# Patient Record
Sex: MALE | Race: WHITE | NOT HISPANIC OR LATINO | Employment: UNEMPLOYED | URBAN - METROPOLITAN AREA
[De-identification: names, ages, dates, MRNs, and addresses within clinical notes are randomized per-mention and may not be internally consistent; named-entity substitution may affect disease eponyms.]

---
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
## 2024-01-25 DIAGNOSIS — F90.1 ATTENTION DEFICIT HYPERACTIVITY DISORDER (ADHD), PREDOMINANTLY HYPERACTIVE TYPE: ICD-10-CM

## 2024-01-25 RX ORDER — LISDEXAMFETAMINE DIMESYLATE 40 MG/1
40 CAPSULE ORAL EVERY MORNING
Qty: 30 CAPSULE | Refills: 0 | Status: SHIPPED | OUTPATIENT
Start: 2024-01-25 | End: 2024-02-27 | Stop reason: SDUPTHER

## 2024-02-22 ENCOUNTER — PATIENT MESSAGE (OUTPATIENT)
Dept: FAMILY MEDICINE | Facility: CLINIC | Age: 19
End: 2024-02-22
Payer: COMMERCIAL

## 2024-02-22 DIAGNOSIS — F90.1 ATTENTION DEFICIT HYPERACTIVITY DISORDER (ADHD), PREDOMINANTLY HYPERACTIVE TYPE: ICD-10-CM

## 2024-02-22 RX ORDER — LISDEXAMFETAMINE DIMESYLATE 40 MG/1
40 CAPSULE ORAL EVERY MORNING
Qty: 30 CAPSULE | Refills: 0 | Status: CANCELLED | OUTPATIENT
Start: 2024-02-22

## 2024-02-23 ENCOUNTER — LAB VISIT (OUTPATIENT)
Dept: LAB | Facility: HOSPITAL | Age: 19
End: 2024-02-23
Attending: STUDENT IN AN ORGANIZED HEALTH CARE EDUCATION/TRAINING PROGRAM
Payer: COMMERCIAL

## 2024-02-23 DIAGNOSIS — F90.1 ATTENTION DEFICIT HYPERACTIVITY DISORDER (ADHD), PREDOMINANTLY HYPERACTIVE TYPE: ICD-10-CM

## 2024-02-23 PROCEDURE — 80355 GABAPENTIN NON-BLOOD: CPT | Performed by: STUDENT IN AN ORGANIZED HEALTH CARE EDUCATION/TRAINING PROGRAM

## 2024-02-23 PROCEDURE — 80326 AMPHETAMINES 5 OR MORE: CPT | Performed by: STUDENT IN AN ORGANIZED HEALTH CARE EDUCATION/TRAINING PROGRAM

## 2024-02-27 DIAGNOSIS — F90.1 ATTENTION DEFICIT HYPERACTIVITY DISORDER (ADHD), PREDOMINANTLY HYPERACTIVE TYPE: ICD-10-CM

## 2024-02-27 LAB
6MAM UR QL: NOT DETECTED
7AMINOCLONAZEPAM UR QL: NOT DETECTED
A-OH ALPRAZ UR QL: NOT DETECTED
ALPHA-OH-MIDAZOLAM: NOT DETECTED
ALPRAZ UR QL: NOT DETECTED
AMPHET UR QL SCN: PRESENT
ANNOTATION COMMENT IMP: NORMAL
BARBITURATES UR QL: NEGATIVE
BUPRENORPHINE UR QL: NOT DETECTED
BZE UR QL: NEGATIVE
CARBOXYTHC UR QL: NEGATIVE
CARISOPRODOL UR QL: NEGATIVE
CLONAZEPAM UR QL: NOT DETECTED
CODEINE UR QL: NOT DETECTED
CREAT UR-MCNC: 105.1 MG/DL (ref 20–400)
DIAZEPAM UR QL: NOT DETECTED
ETHYL GLUCURONIDE UR QL: NEGATIVE
FENTANYL UR QL: NOT DETECTED
GABAPENTIN: NOT DETECTED
HYDROCODONE UR QL: NOT DETECTED
HYDROMORPHONE UR QL: NOT DETECTED
LORAZEPAM UR QL: NOT DETECTED
MDA UR QL: NOT DETECTED
MDEA UR QL: NOT DETECTED
MDMA UR QL: NOT DETECTED
ME-PHENIDATE UR QL: NOT DETECTED
METHADONE UR QL: NEGATIVE
METHAMPHET UR QL: NOT DETECTED
MIDAZOLAM UR QL SCN: NOT DETECTED
MORPHINE UR QL: NOT DETECTED
NALOXONE: NOT DETECTED
NORBUPRENORPHINE UR QL CFM: NOT DETECTED
NORDIAZEPAM UR QL: NOT DETECTED
NORFENTANYL UR QL: NOT DETECTED
NORHYDROCODONE UR QL CFM: NOT DETECTED
NORMEPERIDINE UR QL CFM: NOT DETECTED
NOROXYCODONE UR QL CFM: NOT DETECTED
NOROXYMORPHONE UR QL SCN: NOT DETECTED
OXAZEPAM UR QL: NOT DETECTED
OXYCODONE UR QL: NOT DETECTED
OXYMORPHONE UR QL: NOT DETECTED
PATHOLOGY STUDY: NORMAL
PCP UR QL: NEGATIVE
PHENTERMINE UR QL: NOT DETECTED
PREGABALIN: NOT DETECTED
SERVICE CMNT-IMP: NORMAL
TAPENTADOL UR QL SCN: NOT DETECTED
TAPENTADOL UR QL SCN: NOT DETECTED
TEMAZEPAM UR QL: NOT DETECTED
TRAMADOL UR QL: NEGATIVE
ZOLPIDEM METABOLITE: NOT DETECTED
ZOLPIDEM UR QL: NOT DETECTED

## 2024-02-27 RX ORDER — LISDEXAMFETAMINE DIMESYLATE 40 MG/1
40 CAPSULE ORAL EVERY MORNING
Qty: 30 CAPSULE | Refills: 0 | Status: SHIPPED | OUTPATIENT
Start: 2024-02-27 | End: 2024-03-25 | Stop reason: SDUPTHER

## 2024-03-25 ENCOUNTER — TELEPHONE (OUTPATIENT)
Dept: PSYCHIATRY | Facility: CLINIC | Age: 19
End: 2024-03-25
Payer: COMMERCIAL

## 2024-03-25 ENCOUNTER — PATIENT MESSAGE (OUTPATIENT)
Dept: FAMILY MEDICINE | Facility: CLINIC | Age: 19
End: 2024-03-25
Payer: COMMERCIAL

## 2024-03-25 DIAGNOSIS — F90.1 ATTENTION DEFICIT HYPERACTIVITY DISORDER (ADHD), PREDOMINANTLY HYPERACTIVE TYPE: ICD-10-CM

## 2024-03-25 RX ORDER — LISDEXAMFETAMINE DIMESYLATE 40 MG/1
40 CAPSULE ORAL EVERY MORNING
Qty: 30 CAPSULE | Refills: 0 | Status: SHIPPED | OUTPATIENT
Start: 2024-03-27 | End: 2024-04-29 | Stop reason: SDUPTHER

## 2024-03-25 NOTE — TELEPHONE ENCOUNTER
No care due was identified.  [facility] Embedded Care Due Messages. Reference number: [id number].   3/25/2024 1:40:35 PM CDT   Persistently underwent.  -referral to nutrition sent from 1/9/2020 and informed mom to pick it up at the  to schedule an appointment with the nutrition.

## 2024-03-25 NOTE — TELEPHONE ENCOUNTER
Spoke to patient's mother regarding the voice message. Mom states she already spoke to someone in the office and was told that patient would be added to the wait list. Mom is aware that the wait list is extensive. Mom is unsure who she spoke to regarding this. Mom states they will wait for a call to schedule.     Patient was not placed on the wait list, but has now been added.

## 2024-04-26 ENCOUNTER — PATIENT MESSAGE (OUTPATIENT)
Dept: FAMILY MEDICINE | Facility: CLINIC | Age: 19
End: 2024-04-26
Payer: COMMERCIAL

## 2024-04-29 ENCOUNTER — OFFICE VISIT (OUTPATIENT)
Dept: FAMILY MEDICINE | Facility: CLINIC | Age: 19
End: 2024-04-29
Payer: COMMERCIAL

## 2024-04-29 VITALS
HEART RATE: 87 BPM | WEIGHT: 121.38 LBS | SYSTOLIC BLOOD PRESSURE: 90 MMHG | BODY MASS INDEX: 20.72 KG/M2 | DIASTOLIC BLOOD PRESSURE: 50 MMHG | HEIGHT: 64 IN | OXYGEN SATURATION: 97 %

## 2024-04-29 DIAGNOSIS — F90.1 ATTENTION DEFICIT HYPERACTIVITY DISORDER (ADHD), PREDOMINANTLY HYPERACTIVE TYPE: ICD-10-CM

## 2024-04-29 PROCEDURE — 80355 GABAPENTIN NON-BLOOD: CPT | Performed by: STUDENT IN AN ORGANIZED HEALTH CARE EDUCATION/TRAINING PROGRAM

## 2024-04-29 PROCEDURE — 80326 AMPHETAMINES 5 OR MORE: CPT | Performed by: STUDENT IN AN ORGANIZED HEALTH CARE EDUCATION/TRAINING PROGRAM

## 2024-04-29 PROCEDURE — 3078F DIAST BP <80 MM HG: CPT | Mod: S$GLB,,, | Performed by: STUDENT IN AN ORGANIZED HEALTH CARE EDUCATION/TRAINING PROGRAM

## 2024-04-29 PROCEDURE — 3008F BODY MASS INDEX DOCD: CPT | Mod: S$GLB,,, | Performed by: STUDENT IN AN ORGANIZED HEALTH CARE EDUCATION/TRAINING PROGRAM

## 2024-04-29 PROCEDURE — 99999 PR PBB SHADOW E&M-EST. PATIENT-LVL III: CPT | Mod: PBBFAC,,, | Performed by: STUDENT IN AN ORGANIZED HEALTH CARE EDUCATION/TRAINING PROGRAM

## 2024-04-29 PROCEDURE — 1159F MED LIST DOCD IN RCRD: CPT | Mod: S$GLB,,, | Performed by: STUDENT IN AN ORGANIZED HEALTH CARE EDUCATION/TRAINING PROGRAM

## 2024-04-29 PROCEDURE — 3074F SYST BP LT 130 MM HG: CPT | Mod: S$GLB,,, | Performed by: STUDENT IN AN ORGANIZED HEALTH CARE EDUCATION/TRAINING PROGRAM

## 2024-04-29 PROCEDURE — 99214 OFFICE O/P EST MOD 30 MIN: CPT | Mod: S$GLB,,, | Performed by: STUDENT IN AN ORGANIZED HEALTH CARE EDUCATION/TRAINING PROGRAM

## 2024-04-29 RX ORDER — LISDEXAMFETAMINE DIMESYLATE 40 MG/1
40 CAPSULE ORAL EVERY MORNING
Qty: 30 CAPSULE | Refills: 0 | Status: SHIPPED | OUTPATIENT
Start: 2024-04-29 | End: 2024-05-27 | Stop reason: SDUPTHER

## 2024-04-29 NOTE — PROGRESS NOTES
[facility] Note    Subjective:    The HPI and pertinent ROS is included in the Diagnostic Impression Remarks section at the end of the note. Please see below for further details. Chief complaint is at end of note.     Matias is a pleasant intelligent patient who is here for evaluation.     Modified Medications    Modified Medication Previous Medication    VYVANSE 40 MG CAP VYVANSE 40 mg Cap       Take 1 capsule (40 mg total) by mouth every morning.    Take 1 capsule (40 mg total) by mouth every morning.       Data reviewed 274}  Previous medical records reviewed and summarized in plan section at end of note.      If you are due for any health screening(s) below please notify me so we can arrange them to be ordered and scheduled. Most healthy patients at your age complete them, but you are free to accept or refuse. If you can't do it, I'll definitely understand. If you can, I'd certainly appreciate it!     All of your core healthy metrics are met.      The following portions of the patient's history were reviewed and updated as appropriate: allergies, current medications, past family history, past medical history, past social history, past surgical history and problem list.    He  has a past medical history of ADHD (attention deficit hyperactivity disorder), Asperger syndrome, and Tourette syndrome.  He  has a past surgical history that includes Surgical removal of abscess (2005) and Laparoscopic appendectomy (N/A, 8/28/2019).    He  reports that he is a non-smoker but has been exposed to tobacco smoke. He has never used smokeless tobacco.  He family history is not on file.    Review of patient's allergies indicates:  No Known Allergies    Tobacco Use: Medium Risk (4/29/2024)    Patient History     Smoking Tobacco Use: Passive Smoke Exposure - Never Smoker     Smokeless Tobacco Use: Never     Passive Exposure: Yes     Physical Examination  General appearance: alert, cooperative, no distress  Neck: no  "thyromegaly, no neck stiffness  Lungs: clear to auscultation, no wheezes, rales or rhonchi, symmetric air entry  Heart: normal rate, regular rhythm, normal S1, S2, no murmurs, rubs, clicks or gallops  Abdomen: soft, nontender, nondistended, no rigidity, rebound, or guarding.   Back: no point tenderness over spine  Extremities: peripheral pulses normal, no unilateral leg swelling or calf tenderness   Neurological:alert, oriented, normal speech, no new focal findings or movement disorder noted from baseline    BP Readings from Last 3 Encounters:   04/29/24 (!) 90/50   09/29/23 102/69   09/01/19 125/72 (91%, Z = 1.34 /  82%, Z = 0.92)*     *BP percentiles are based on the 2017 AAP Clinical Practice Guideline for boys     Wt Readings from Last 3 Encounters:   04/29/24 55.1 kg (121 lb 6.4 oz) (5%, Z= -1.68)*   09/29/23 52.2 kg (115 lb) (2%, Z= -2.02)*   08/31/19 59.6 kg (131 lb 8.1 oz) (65%, Z= 0.39)*     * Growth percentiles are based on CDC (Boys, 2-20 Years) data.     BP (!) 90/50 (BP Location: Left arm, Patient Position: Sitting, BP Method: Medium (Manual))   Pulse 87   Ht 5' 4" (1.626 m)   Wt 55.1 kg (121 lb 6.4 oz)   SpO2 97%   BMI 20.84 kg/m²    274}  Laboratory: I have reviewed old labs below:    274}    Lab Results   Component Value Date    WBC 12.42 08/30/2019    HGB 12.4 (L) 08/30/2019    HCT 36.4 (L) 08/30/2019    MCV 87 08/30/2019     (L) 08/30/2019     08/30/2019    K 4.1 08/30/2019     08/30/2019    CALCIUM 9.2 08/30/2019    CO2 26 08/30/2019    GLU 94 08/30/2019    BUN 7 08/30/2019    CREATININE 0.7 08/30/2019    ANIONGAP 8 08/30/2019    PROT 8.2 08/28/2019    ALBUMIN 4.8 (H) 08/28/2019    BILITOT 1.5 (H) 08/28/2019    ALKPHOS 136 08/28/2019    ALT 18 08/28/2019    AST 29 08/28/2019    CHOL 114 (L) 09/29/2023    TRIG 82 09/29/2023    HDL 44 09/29/2023    LDLCALC 53.6 (L) 09/29/2023      Lab reviewed by me: Particular labs of significance that I will monitor, workup, or treat to " "improve are mentioned below in diagnostic impression remarks.    Imaging/EKG: I have reviewed the pertinent results and my findings are noted in remarks.  274}    CC:   Chief Complaint   Patient presents with    Medication Refill        274}    Assessment/Plan  Matias Levin is a 18 y.o. male who presents to clinic with:  1. Attention deficit hyperactivity disorder (ADHD), predominantly hyperactive type       274}  Diagnostic Impression Remarks + HPI     Documentation entered by me for this encounter may have been done in part using speech-recognition technology. Although I have made an effort to ensure accuracy, "sound like" errors may exist and should be interpreted in context.      Ahdd: patient here for follow up for adhd. Patient know he is doing well on his medication for his part job at "[employer] and noticed a significant difference when not being on the medication. Patient and mother has tried to find a mental health specialist. Will extend candice period d/t good justin effort    Additional workup planned: see labs ordered below.    See below for labs and meds ordered with associated diagnosis      1. Attention deficit hyperactivity disorder (ADHD), predominantly hyperactive type  - VYVANSE 40 mg Cap; Take 1 capsule (40 mg total) by mouth every morning.  Dispense: 30 capsule; Refill: 0  - POCT Urine Drug Screen Pain Management  - Pain Clinic Drug Screen      Jose Hughes MD   274}    If you are due for any health screening(s) below please notify me so we can arrange them to be ordered and scheduled. Most healthy patients at your age complete them, but you are free to accept or refuse.     If you can't do it, I'll definitely understand. If you can, I'd certainly appreciate it!   All of your core healthy metrics are met.        "

## 2024-05-03 LAB
1OH-MIDAZOLAM UR QL SCN: NOT DETECTED
6MAM UR QL: NOT DETECTED
7AMINOCLONAZEPAM UR QL: NOT DETECTED
A-OH ALPRAZ UR QL: NOT DETECTED
ALPRAZ UR QL: NOT DETECTED
AMPHET UR QL SCN: PRESENT
ANNOTATION COMMENT IMP: NORMAL
BARBITURATES UR QL: NEGATIVE
BUPRENORPHINE UR QL: NOT DETECTED
BZE UR QL: NEGATIVE
CARBOXYTHC UR QL: NEGATIVE
CARISOPRODOL UR QL: NEGATIVE
CLONAZEPAM UR QL: NOT DETECTED
CODEINE UR QL: NOT DETECTED
CREAT UR-MCNC: 187.6 MG/DL (ref 20–400)
DIAZEPAM UR QL: NOT DETECTED
ETHYL GLUCURONIDE UR QL: NEGATIVE
FENTANYL UR QL: NOT DETECTED
GABAPENTIN: NOT DETECTED
HYDROCODONE UR QL: NOT DETECTED
HYDROMORPHONE UR QL: NOT DETECTED
LORAZEPAM UR QL: NOT DETECTED
MDA UR QL: NOT DETECTED
MDEA UR QL: NOT DETECTED
MDMA UR QL: NOT DETECTED
ME-PHENIDATE UR QL: NOT DETECTED
METHADONE UR QL: NEGATIVE
METHAMPHET UR QL: NOT DETECTED
MIDAZOLAM UR QL SCN: NOT DETECTED
MORPHINE UR QL: NOT DETECTED
NALOXONE: NOT DETECTED
NORBUPRENORPHINE UR QL CFM: NOT DETECTED
NORDIAZEPAM UR QL: NOT DETECTED
NORFENTANYL UR QL: NOT DETECTED
NORHYDROCODONE UR QL CFM: NOT DETECTED
NORMEPERIDINE UR QL CFM: NOT DETECTED
NOROXYCODONE UR QL CFM: NOT DETECTED
NOROXYMORPHONE UR QL SCN: NOT DETECTED
OXAZEPAM UR QL: NOT DETECTED
OXYCODONE UR QL: NOT DETECTED
OXYMORPHONE UR QL: NOT DETECTED
PATHOLOGY STUDY: NORMAL
PCP UR QL: NEGATIVE
PHENTERMINE UR QL: NOT DETECTED
PREGABALIN: NOT DETECTED
SERVICE CMNT-IMP: NORMAL
TAPENTADOL UR QL SCN: NOT DETECTED
TAPENTADOL UR QL SCN: NOT DETECTED
TEMAZEPAM UR QL: NOT DETECTED
TRAMADOL UR QL: NEGATIVE
ZOLPIDEM PHENYL-4-CARB UR QL SCN: NOT DETECTED
ZOLPIDEM UR QL: NOT DETECTED

## 2024-05-27 DIAGNOSIS — F90.1 ATTENTION DEFICIT HYPERACTIVITY DISORDER (ADHD), PREDOMINANTLY HYPERACTIVE TYPE: ICD-10-CM

## 2024-05-27 RX ORDER — LISDEXAMFETAMINE DIMESYLATE 40 MG/1
40 CAPSULE ORAL EVERY MORNING
Qty: 30 CAPSULE | Refills: 0 | Status: SHIPPED | OUTPATIENT
Start: 2024-05-27

## 2024-05-27 NOTE — TELEPHONE ENCOUNTER
No care due was identified.  [facility] Embedded Care Due Messages. Reference number: [id number].   5/27/2024 11:34:36 AM CDT

## 2024-06-25 DIAGNOSIS — F90.1 ATTENTION DEFICIT HYPERACTIVITY DISORDER (ADHD), PREDOMINANTLY HYPERACTIVE TYPE: ICD-10-CM

## 2024-06-25 RX ORDER — LISDEXAMFETAMINE DIMESYLATE 40 MG/1
40 CAPSULE ORAL EVERY MORNING
Qty: 30 CAPSULE | Refills: 0 | Status: SHIPPED | OUTPATIENT
Start: 2024-06-25

## 2024-06-25 NOTE — TELEPHONE ENCOUNTER
No care due was identified.  [facility] Embedded Care Due Messages. Reference number: [id number].   6/25/2024 6:36:38 AM CDT

## 2024-07-18 ENCOUNTER — TELEPHONE (OUTPATIENT)
Dept: FAMILY MEDICINE | Facility: CLINIC | Age: 19
End: 2024-07-18
Payer: COMMERCIAL

## 2024-07-18 NOTE — TELEPHONE ENCOUNTER
Spoke with patients mom and informed her to call psychiatry clinic and see if appt can be made. I sent a location form so they can see if patient can be seen sooner to get med refill.

## 2024-07-18 NOTE — TELEPHONE ENCOUNTER
"Spoke with pt mom in regards to Dr. Hughes message,"Thank you for making an appointment with us for medication refills. However, we have ran out of refills without any mental health evaluation. We will be happy to see Matias today for any concerns but we are unable to refill any controlled stimulant today. "    Pt is currently on wait list for mental health evaluation.   "

## 2024-07-18 NOTE — TELEPHONE ENCOUNTER
----- Message from Karen Coleman sent at 7/18/2024  8:34 AM CDT -----  Contact: MOTHER MOSER  Type:  Patient Returning Call    Who Called: SHANTI, MOTHER   Who Left Message for Patient:JAMES  Does the patient know what this is regarding?:APPT  Would the patient rather a call back or a response via [facility]? CALL   Best Call Back Number: [phone] WORK   Additional Information: THANK YOU

## 2024-08-01 ENCOUNTER — TELEPHONE (OUTPATIENT)
Dept: FAMILY MEDICINE | Facility: CLINIC | Age: 19
End: 2024-08-01
Payer: COMMERCIAL

## 2024-08-09 ENCOUNTER — OFFICE VISIT (OUTPATIENT)
Dept: FAMILY MEDICINE | Facility: CLINIC | Age: 19
End: 2024-08-09
Payer: COMMERCIAL

## 2024-08-09 VITALS
HEART RATE: 72 BPM | DIASTOLIC BLOOD PRESSURE: 66 MMHG | SYSTOLIC BLOOD PRESSURE: 104 MMHG | WEIGHT: 130.81 LBS | OXYGEN SATURATION: 96 % | BODY MASS INDEX: 22.33 KG/M2 | RESPIRATION RATE: 14 BRPM | HEIGHT: 64 IN

## 2024-08-09 DIAGNOSIS — F90.1 ATTENTION DEFICIT HYPERACTIVITY DISORDER (ADHD), PREDOMINANTLY HYPERACTIVE TYPE: ICD-10-CM

## 2024-08-09 PROCEDURE — 99999 PR PBB SHADOW E&M-EST. PATIENT-LVL III: CPT | Mod: PBBFAC,,, | Performed by: FAMILY MEDICINE

## 2024-08-09 RX ORDER — LISDEXAMFETAMINE DIMESYLATE 40 MG/1
40 CAPSULE ORAL EVERY MORNING
Qty: 30 CAPSULE | Refills: 0 | Status: SHIPPED | OUTPATIENT
Start: 2024-09-09

## 2024-08-09 RX ORDER — LISDEXAMFETAMINE DIMESYLATE 40 MG/1
40 CAPSULE ORAL EVERY MORNING
Qty: 30 CAPSULE | Refills: 0 | Status: SHIPPED | OUTPATIENT
Start: 2024-08-09

## 2024-08-09 RX ORDER — LISDEXAMFETAMINE DIMESYLATE 40 MG/1
40 CAPSULE ORAL EVERY MORNING
Qty: 30 CAPSULE | Refills: 0 | Status: SHIPPED | OUTPATIENT
Start: 2024-10-09

## 2024-08-21 ENCOUNTER — TELEPHONE (OUTPATIENT)
Dept: PSYCHIATRY | Facility: CLINIC | Age: 19
End: 2024-08-21
Payer: COMMERCIAL

## 2024-08-21 NOTE — TELEPHONE ENCOUNTER
Called patient to schedule a new patient medication management appointment from the wait list. No answer, left voice message, sent my chart message

## 2024-08-27 NOTE — TELEPHONE ENCOUNTER
Called pt regarding below message. Left voicemail with return number.     Unable to contact the patient referral closed.

## 2024-10-31 ENCOUNTER — PATIENT MESSAGE (OUTPATIENT)
Dept: FAMILY MEDICINE | Facility: CLINIC | Age: 19
End: 2024-10-31
Payer: COMMERCIAL

## 2024-11-11 ENCOUNTER — OFFICE VISIT (OUTPATIENT)
Dept: FAMILY MEDICINE | Facility: CLINIC | Age: 19
End: 2024-11-11
Payer: COMMERCIAL

## 2024-11-11 VITALS
BODY MASS INDEX: 22.92 KG/M2 | RESPIRATION RATE: 12 BRPM | TEMPERATURE: 99 F | WEIGHT: 133.5 LBS | DIASTOLIC BLOOD PRESSURE: 60 MMHG | OXYGEN SATURATION: 97 % | SYSTOLIC BLOOD PRESSURE: 98 MMHG | HEART RATE: 71 BPM

## 2024-11-11 DIAGNOSIS — F84.5 ASPERGER'S SYNDROME: Primary | ICD-10-CM

## 2024-11-11 DIAGNOSIS — E55.9 VITAMIN D DEFICIENCY DISEASE: ICD-10-CM

## 2024-11-11 DIAGNOSIS — F90.1 ATTENTION DEFICIT HYPERACTIVITY DISORDER (ADHD), PREDOMINANTLY HYPERACTIVE TYPE: ICD-10-CM

## 2024-11-11 LAB
AMP AMPHETAMINE 1000 NM/ML POC: ABNORMAL
BAR BARBITURATES 300 NG/ML POC: NEGATIVE
BUP BUPRENORPHINE 10 NG/ML POC: NEGATIVE
BZO BENZODIAZEPINES 300 NG/ML POC: NEGATIVE
COC COCAINE 300 NG/ML POC: NEGATIVE
CREATININE (CR) POC: NEGATIVE
CTP QC/QA: YES
MET METHAMPHETAMINE 1000 NG/ML POC: NEGATIVE
MOP/OPI300 MORPHINE 300 NG/ML POC: NEGATIVE
MTD METHADONE 300 NG/ML POC: NEGATIVE
OXIDANT (OX) POC: NEGATIVE
OXY OXYCODONE 100 NG/ML POC: NEGATIVE
SPECIFIC GRAVITY (SG) POC: 1.01
TEMPERATURE (°F) POC: 90
THC MARIJUANA 50 NG/ML POC: NEGATIVE

## 2024-11-11 PROCEDURE — 3078F DIAST BP <80 MM HG: CPT | Mod: S$GLB,,, | Performed by: STUDENT IN AN ORGANIZED HEALTH CARE EDUCATION/TRAINING PROGRAM

## 2024-11-11 PROCEDURE — 99999 PR PBB SHADOW E&M-EST. PATIENT-LVL III: CPT | Mod: PBBFAC,,, | Performed by: STUDENT IN AN ORGANIZED HEALTH CARE EDUCATION/TRAINING PROGRAM

## 2024-11-11 PROCEDURE — 80305 DRUG TEST PRSMV DIR OPT OBS: CPT | Mod: QW,S$GLB,, | Performed by: STUDENT IN AN ORGANIZED HEALTH CARE EDUCATION/TRAINING PROGRAM

## 2024-11-11 PROCEDURE — 99214 OFFICE O/P EST MOD 30 MIN: CPT | Mod: S$GLB,,, | Performed by: STUDENT IN AN ORGANIZED HEALTH CARE EDUCATION/TRAINING PROGRAM

## 2024-11-11 PROCEDURE — 3008F BODY MASS INDEX DOCD: CPT | Mod: S$GLB,,, | Performed by: STUDENT IN AN ORGANIZED HEALTH CARE EDUCATION/TRAINING PROGRAM

## 2024-11-11 PROCEDURE — 3074F SYST BP LT 130 MM HG: CPT | Mod: S$GLB,,, | Performed by: STUDENT IN AN ORGANIZED HEALTH CARE EDUCATION/TRAINING PROGRAM

## 2024-11-11 RX ORDER — LISDEXAMFETAMINE DIMESYLATE 40 MG/1
40 CAPSULE ORAL EVERY MORNING
Qty: 30 CAPSULE | Refills: 0 | Status: SHIPPED | OUTPATIENT
Start: 2025-01-11

## 2024-11-11 RX ORDER — LISDEXAMFETAMINE DIMESYLATE 40 MG/1
40 CAPSULE ORAL EVERY MORNING
Qty: 30 CAPSULE | Refills: 0 | Status: SHIPPED | OUTPATIENT
Start: 2024-11-11

## 2024-11-11 RX ORDER — LISDEXAMFETAMINE DIMESYLATE 40 MG/1
40 CAPSULE ORAL EVERY MORNING
Qty: 30 CAPSULE | Refills: 0 | Status: SHIPPED | OUTPATIENT
Start: 2024-12-11

## 2024-11-11 NOTE — PROGRESS NOTES
"SUBJECTIVE:    CHIEF COMPLAINT:   Chief Complaint   Patient presents with    Medication Refill           274}    HISTORY OF PRESENT ILLNESS:  Matias Gill is a 19 y.o. male who presents to the clinic today   History of Present Illness    CHIEF COMPLAINT:  Mr. Chelo gill presents today for follow-up on ADHD medication management.    ADHD MANAGEMENT:  He currently takes Vyvanse for ADHD, occasionally taking 1-2 day breaks. During these breaks, he experiences drowsiness and increased appetite. He continues medication on days off work. He receives a three-month supply, which he finds helpful for managing his regimen.    DEPRESSION:  He experiences occasional depression, occurring at least once a month and lasting about 1-2 weeks. He denies specific triggers and primarily josy by "waiting it out." He reports no previous diagnosis of depression or use of depression medications.    SUBSTANCE USE:  He denies smoking.    ROS:  General: -fever, -chills, -fatigue, -weight gain, -weight loss  Eyes: -vision changes, -redness, -discharge  ENT: -ear pain, -nasal congestion, -sore throat  Cardiovascular: -chest pain, -palpitations, -lower extremity edema  Respiratory: -cough, -shortness of breath  Gastrointestinal: -abdominal pain, -nausea, -vomiting, -diarrhea, -constipation, -blood in stool  Genitourinary: -dysuria, -hematuria, -frequency  Musculoskeletal: -joint pain, -muscle pain  Skin: -rash, -lesion  Neurological: -headache, -dizziness, -numbness, -tingling  Psychiatric: -anxiety, +depression, -sleep difficulty          PAST MEDICAL HISTORY:     274}  Past Medical History:   Diagnosis Date    ADHD (attention deficit hyperactivity disorder)     Asperger syndrome     Tourette syndrome     mild       PAST SURGICAL HISTORY:  Past Surgical History:   Procedure Laterality Date    LAPAROSCOPIC APPENDECTOMY N/A 8/28/2019    Procedure: APPENDECTOMY, LAPAROSCOPIC;  Surgeon: Adan Barone MD;  Location: [facility] OR;  " Service: General;  Laterality: N/A;    SURGICAL REMOVAL OF ABSCESS  2005    Abdominal lymphnode abscess removal       SOCIAL HISTORY:  Social History     Socioeconomic History    Marital status: Single   Tobacco Use    Smoking status: Never     Passive exposure: Yes    Smokeless tobacco: Never    Tobacco comments:     step father and step mother     Social Drivers of Health     Financial Resource Strain: Patient Declined (12/26/2023)    Overall Financial Resource Strain (CARDIA)     Difficulty of Paying Living Expenses: Patient declined   Food Insecurity: Patient Declined (12/26/2023)    Hunger Vital Sign     Worried About Running Out of Food in the Last Year: Patient declined     Ran Out of Food in the Last Year: Patient declined   Transportation Needs: No Transportation Needs (12/26/2023)    PRAPARE - Transportation     Lack of Transportation (Medical): No     Lack of Transportation (Non-Medical): No   Physical Activity: Sufficiently Active (12/26/2023)    Exercise Vital Sign     Days of Exercise per Week: 5 days     Minutes of Exercise per Session: 120 min   Stress: Stress Concern Present (12/26/2023)    [employer] - Occupational Stress Questionnaire     Feeling of Stress : Very much   Housing Stability: Unknown (12/26/2023)    Housing Stability Vital Sign     Unable to Pay for Housing in the Last Year: No     Unstable Housing in the Last Year: Patient declined       FAMILY HISTORY:       No family history on file.    ALLERGIES AND MEDICATIONS: updated and reviewed.      274}  Review of patient's allergies indicates:  No Known Allergies  Medication List with Changes/Refills   Changed and/or Refilled Medications    Modified Medication Previous Medication    VYVANSE 40 MG CAP VYVANSE 40 mg Cap       Take 1 capsule (40 mg total) by mouth every morning.    Take 1 capsule (40 mg total) by mouth every morning.    VYVANSE 40 MG CAP VYVANSE 40 mg Cap       Take 1 capsule (40 mg total) by  "mouth every morning.    Take 1 capsule (40 mg total) by mouth every morning.    VYVANSE 40 MG CAP VYVANSE 40 mg Cap       Take 1 capsule (40 mg total) by mouth every morning.    Take 1 capsule (40 mg total) by mouth every morning.       SCREENING HISTORY:    274}  Health Maintenance         Date Due Completion Date    HPV Vaccines (1 - Male 3-dose series) Never done ---    Influenza Vaccine (1) 09/01/2024 9/29/2023    COVID-19 Vaccine (1 - 2024-25 season) Never done ---    TETANUS VACCINE 07/20/2027 7/20/2017    RSV Vaccine (Age 60+ and Pregnant patients) (1 - 1-dose 75+ series) 11/01/2079 ---            REVIEW OF SYSTEMS:   ROS    PHYSICAL EXAM:      274}  BP 98/60 (BP Location: Right arm, Patient Position: Sitting)   Pulse 71   Temp 98.5 °F (36.9 °C) (Oral)   Resp 12   Wt 60.6 kg (133 lb 8 oz)   SpO2 97%   BMI 22.92 kg/m²   Wt Readings from Last 3 Encounters:   11/11/24 60.6 kg (133 lb 8 oz)   08/09/24 59.3 kg (130 lb 12.8 oz) (13%, Z= -1.15)*   04/29/24 55.1 kg (121 lb 6.4 oz) (5%, Z= -1.68)*     * Growth percentiles are based on CDC (Boys, 2-20 Years) data.     BP Readings from Last 3 Encounters:   11/11/24 98/60   08/09/24 104/66   04/29/24 (!) 90/50     Estimated body mass index is 22.92 kg/m² as calculated from the following:    Height as of 8/9/24: 5' 4" (1.626 m).    Weight as of this encounter: 60.6 kg (133 lb 8 oz).     Physical Exam  Constitutional:       Appearance: Normal appearance.   HENT:      Head: Normocephalic and atraumatic.      Nose: Nose normal.      Mouth/Throat:      Mouth: Mucous membranes are dry.      Pharynx: Oropharynx is clear.   Eyes:      Extraocular Movements: Extraocular movements intact.      Conjunctiva/sclera: Conjunctivae normal.   Cardiovascular:      Rate and Rhythm: Normal rate and regular rhythm.   Pulmonary:      Effort: Pulmonary effort is normal.      Breath sounds: Normal breath sounds.   Abdominal:      General: Bowel sounds are normal.      Palpations: Abdomen " is soft.   Musculoskeletal:         General: Normal range of motion.      Cervical back: Normal range of motion.   Skin:     General: Skin is warm.   Neurological:      General: No focal deficit present.      Mental Status: He is alert. Mental status is at baseline.   Psychiatric:         Mood and Affect: Mood normal.         Thought Content: Thought content normal.         LABS:   274}  I have reviewed old labs below:  Lab Results   Component Value Date    WBC 12.42 08/30/2019    HGB 12.4 (L) 08/30/2019    HCT 36.4 (L) 08/30/2019    MCV 87 08/30/2019     (L) 08/30/2019     08/30/2019    K 4.1 08/30/2019     08/30/2019    CALCIUM 9.2 08/30/2019    CO2 26 08/30/2019    GLU 94 08/30/2019    BUN 7 08/30/2019    CREATININE 0.7 08/30/2019    ANIONGAP 8 08/30/2019    ESTGFRAFRICA SEE COMMENT 08/30/2019    EGFRNONAA SEE COMMENT 08/30/2019    PROT 8.2 08/28/2019    ALBUMIN 4.8 (H) 08/28/2019    BILITOT 1.5 (H) 08/28/2019    ALKPHOS 136 08/28/2019    ALT 18 08/28/2019    AST 29 08/28/2019    CHOL 114 (L) 09/29/2023    TRIG 82 09/29/2023    HDL 44 09/29/2023    LDLCALC 53.6 (L) 09/29/2023       ASSESSMENT AND PLAN:  274}  Assessment & Plan    IMPRESSION:  Assessed effectiveness of current ADHD management with Vyvanse  Evaluated patient's adherence to medication regimen, including use of drug holidays  Considered impact of Vyvanse on patient's daily functioning, including work performance  Assessed for presence of depressive symptoms and their frequency/duration  Determined need for routine health monitoring via bloodwork and urine test    ATTENTION DEFICIT HYPERACTIVITY DISORDER (ADHD):  Continued Vyvanse at current dose.  Refilled for a 3-month supply.    LABS:  Ordered labs to check liver and kidney function.  Ordered urine test.    FOLLOW UP:  Contact the office if any concerns arise.        1. Attention deficit hyperactivity disorder (ADHD), predominantly hyperactive type  - VYVANSE 40 mg Cap; Take 1  capsule (40 mg total) by mouth every morning.  Dispense: 30 capsule; Refill: 0  - VYVANSE 40 mg Cap; Take 1 capsule (40 mg total) by mouth every morning.  Dispense: 30 capsule; Refill: 0  - CBC Auto Differential; Future  - Comprehensive Metabolic Panel; Future  - VYVANSE 40 mg Cap; Take 1 capsule (40 mg total) by mouth every morning.  Dispense: 30 capsule; Refill: 0  - POCT Urine Drug Screen (With BUP)    2. Asperger's syndrome  - TSH; Future    3. Vitamin D deficiency disease  - Vitamin D 25-Hydroxy; Future         Orders Placed This Encounter   Procedures    CBC Auto Differential    Comprehensive Metabolic Panel    Vitamin D 25-Hydroxy    TSH    POCT Urine Drug Screen (With BUP)       No follow-ups on file. or sooner as needed.

## 2025-02-05 ENCOUNTER — OFFICE VISIT (OUTPATIENT)
Dept: FAMILY MEDICINE | Facility: CLINIC | Age: 20
End: 2025-02-05
Payer: COMMERCIAL

## 2025-02-05 VITALS
SYSTOLIC BLOOD PRESSURE: 96 MMHG | OXYGEN SATURATION: 96 % | DIASTOLIC BLOOD PRESSURE: 70 MMHG | HEIGHT: 64 IN | HEART RATE: 80 BPM | BODY MASS INDEX: 22.28 KG/M2 | WEIGHT: 130.5 LBS

## 2025-02-05 DIAGNOSIS — F84.5 ASPERGER'S SYNDROME: Primary | ICD-10-CM

## 2025-02-05 DIAGNOSIS — F90.1 ATTENTION DEFICIT HYPERACTIVITY DISORDER (ADHD), PREDOMINANTLY HYPERACTIVE TYPE: ICD-10-CM

## 2025-02-05 DIAGNOSIS — R79.9 ABNORMAL BLOOD CHEMISTRY: ICD-10-CM

## 2025-02-05 DIAGNOSIS — Z13.6 ENCOUNTER FOR LIPID SCREENING FOR CARDIOVASCULAR DISEASE: ICD-10-CM

## 2025-02-05 DIAGNOSIS — Z13.220 ENCOUNTER FOR LIPID SCREENING FOR CARDIOVASCULAR DISEASE: ICD-10-CM

## 2025-02-05 DIAGNOSIS — E55.9 VITAMIN D DEFICIENCY DISEASE: ICD-10-CM

## 2025-02-05 LAB
AMP AMPHETAMINE 1000 NM/ML POC: ABNORMAL
BAR BARBITURATES 300 NG/ML POC: NEGATIVE
BUP BUPRENORPHINE 10 NG/ML POC: NEGATIVE
BZO BENZODIAZEPINES 300 NG/ML POC: NEGATIVE
COC COCAINE 300 NG/ML POC: NEGATIVE
CREATININE (CR) POC: 50
CTP QC/QA: YES
MET METHAMPHETAMINE 1000 NG/ML POC: NEGATIVE
MOP/OPI300 MORPHINE 300 NG/ML POC: NEGATIVE
MTD METHADONE 300 NG/ML POC: NEGATIVE
OXIDANT (OX) POC: NEGATIVE
OXY OXYCODONE 100 NG/ML POC: NEGATIVE
SPECIFIC GRAVITY (SG) POC: 1.02
TEMPERATURE (°F) POC: 92
THC MARIJUANA 50 NG/ML POC: NEGATIVE

## 2025-02-05 PROCEDURE — 3008F BODY MASS INDEX DOCD: CPT | Mod: S$GLB,,, | Performed by: STUDENT IN AN ORGANIZED HEALTH CARE EDUCATION/TRAINING PROGRAM

## 2025-02-05 PROCEDURE — 99214 OFFICE O/P EST MOD 30 MIN: CPT | Mod: S$GLB,,, | Performed by: STUDENT IN AN ORGANIZED HEALTH CARE EDUCATION/TRAINING PROGRAM

## 2025-02-05 PROCEDURE — 1159F MED LIST DOCD IN RCRD: CPT | Mod: S$GLB,,, | Performed by: STUDENT IN AN ORGANIZED HEALTH CARE EDUCATION/TRAINING PROGRAM

## 2025-02-05 PROCEDURE — 3078F DIAST BP <80 MM HG: CPT | Mod: S$GLB,,, | Performed by: STUDENT IN AN ORGANIZED HEALTH CARE EDUCATION/TRAINING PROGRAM

## 2025-02-05 PROCEDURE — 80305 DRUG TEST PRSMV DIR OPT OBS: CPT | Mod: QW,S$GLB,, | Performed by: STUDENT IN AN ORGANIZED HEALTH CARE EDUCATION/TRAINING PROGRAM

## 2025-02-05 PROCEDURE — 99999 PR PBB SHADOW E&M-EST. PATIENT-LVL III: CPT | Mod: PBBFAC,,, | Performed by: STUDENT IN AN ORGANIZED HEALTH CARE EDUCATION/TRAINING PROGRAM

## 2025-02-05 PROCEDURE — 3074F SYST BP LT 130 MM HG: CPT | Mod: S$GLB,,, | Performed by: STUDENT IN AN ORGANIZED HEALTH CARE EDUCATION/TRAINING PROGRAM

## 2025-02-05 RX ORDER — LISDEXAMFETAMINE DIMESYLATE 40 MG/1
40 CAPSULE ORAL EVERY MORNING
Qty: 30 CAPSULE | Refills: 0 | Status: SHIPPED | OUTPATIENT
Start: 2025-04-11

## 2025-02-05 RX ORDER — LISDEXAMFETAMINE DIMESYLATE 40 MG/1
40 CAPSULE ORAL EVERY MORNING
Qty: 30 CAPSULE | Refills: 0 | Status: SHIPPED | OUTPATIENT
Start: 2025-03-11

## 2025-02-05 RX ORDER — LISDEXAMFETAMINE DIMESYLATE 40 MG/1
40 CAPSULE ORAL EVERY MORNING
Qty: 30 CAPSULE | Refills: 0 | Status: SHIPPED | OUTPATIENT
Start: 2025-02-11

## 2025-02-05 NOTE — PROGRESS NOTES
SUBJECTIVE:    CHIEF COMPLAINT:   Chief Complaint   Patient presents with    Medication Refill           274}    HISTORY OF PRESENT ILLNESS:  Matias Gill is a 19 y.o. male who presents to the clinic today   History of Present Illness    CHIEF COMPLAINT:  Mr. Chelo gill presents today for follow up    ADHD:  He was diagnosed with ADHD following psychological evaluation in summer 2022. He takes Vyvanse daily, including non-work days. He denies depression or anxiety.    SOCIAL HISTORY:  He denies smoking, vaping, or tobacco use.      ROS:  General: -fever, -chills, -fatigue, -weight gain, -weight loss  Eyes: -vision changes, -redness, -discharge  ENT: -ear pain, -nasal congestion, -sore throat  Cardiovascular: -chest pain, -palpitations, -lower extremity edema  Respiratory: -cough, -shortness of breath  Gastrointestinal: -abdominal pain, -nausea, -vomiting, -diarrhea, -constipation, -blood in stool  Genitourinary: -dysuria, -hematuria, -frequency  Musculoskeletal: -joint pain, -muscle pain  Skin: -rash, -lesion  Neurological: -headache, -dizziness, -numbness, -tingling  Psychiatric: -anxiety, -depression, -sleep difficulty          PAST MEDICAL HISTORY:     274}  Past Medical History:   Diagnosis Date    ADHD (attention deficit hyperactivity disorder)     Asperger syndrome     Tourette syndrome     mild       PAST SURGICAL HISTORY:  Past Surgical History:   Procedure Laterality Date    LAPAROSCOPIC APPENDECTOMY N/A 8/28/2019    Procedure: APPENDECTOMY, LAPAROSCOPIC;  Surgeon: Adan Barone MD;  Location: [facility];  Service: General;  Laterality: N/A;    SURGICAL REMOVAL OF ABSCESS  2005    Abdominal lymphnode abscess removal       SOCIAL HISTORY:  Social History     Socioeconomic History    Marital status: Single   Tobacco Use    Smoking status: Never     Passive exposure: Yes    Smokeless tobacco: Never    Tobacco comments:     step father and step mother     Social Drivers of Health     Financial  Resource Strain: Patient Declined (12/26/2023)    Overall Financial Resource Strain (CARDIA)     Difficulty of Paying Living Expenses: Patient declined   Food Insecurity: Patient Declined (12/26/2023)    Hunger Vital Sign     Worried About Running Out of Food in the Last Year: Patient declined     Ran Out of Food in the Last Year: Patient declined   Transportation Needs: No Transportation Needs (12/26/2023)    PRAPARE - Transportation     Lack of Transportation (Medical): No     Lack of Transportation (Non-Medical): No   Physical Activity: Sufficiently Active (12/26/2023)    Exercise Vital Sign     Days of Exercise per Week: 5 days     Minutes of Exercise per Session: 120 min   Stress: Stress Concern Present (12/26/2023)    [employer] [place] [employer] - Occupational Stress Questionnaire     Feeling of Stress : Very much   Housing Stability: Unknown (12/26/2023)    Housing Stability Vital Sign     Unable to Pay for Housing in the Last Year: No     Unstable Housing in the Last Year: Patient declined       FAMILY HISTORY:       No family history on file.    ALLERGIES AND MEDICATIONS: updated and reviewed.      274}  Review of patient's allergies indicates:  No Known Allergies  Medication List with Changes/Refills   Changed and/or Refilled Medications    Modified Medication Previous Medication    VYVANSE 40 MG CAP VYVANSE 40 mg Cap       Take 1 capsule (40 mg total) by mouth every morning.    Take 1 capsule (40 mg total) by mouth every morning.    VYVANSE 40 MG CAP VYVANSE 40 mg Cap       Take 1 capsule (40 mg total) by mouth every morning.    Take 1 capsule (40 mg total) by mouth every morning.    VYVANSE 40 MG CAP VYVANSE 40 mg Cap       Take 1 capsule (40 mg total) by mouth every morning.    Take 1 capsule (40 mg total) by mouth every morning.       SCREENING HISTORY:    274}  Health Maintenance         Date Due Completion Date    HPV Vaccines (1 - Male 3-dose series) Never done ---    Influenza Vaccine  "(1) 09/01/2024 9/29/2023    COVID-19 Vaccine (1 - 2024-25 season) Never done ---    TETANUS VACCINE 07/20/2027 7/20/2017    RSV Vaccine (Age 60+ and Pregnant patients) (1 - 1-dose 75+ series) 11/01/2079 ---            REVIEW OF SYSTEMS:   ROS    PHYSICAL EXAM:      274}  BP 96/70 (BP Location: Left arm, Patient Position: Sitting)   Pulse 80   Ht 5' 4" (1.626 m)   Wt 59.2 kg (130 lb 8.2 oz)   SpO2 96%   BMI 22.40 kg/m²   Wt Readings from Last 3 Encounters:   02/05/25 59.2 kg (130 lb 8.2 oz)   11/11/24 60.6 kg (133 lb 8 oz)   08/09/24 59.3 kg (130 lb 12.8 oz) (13%, Z= -1.15)*     * Growth percentiles are based on CDC (Boys, 2-20 Years) data.     BP Readings from Last 3 Encounters:   02/05/25 96/70   11/11/24 98/60   08/09/24 104/66     Estimated body mass index is 22.4 kg/m² as calculated from the following:    Height as of this encounter: 5' 4" (1.626 m).    Weight as of this encounter: 59.2 kg (130 lb 8.2 oz).     Physical Exam  HENT:      Head: Normocephalic and atraumatic.      Nose: Nose normal.      Mouth/Throat:      Mouth: Mucous membranes are dry.      Pharynx: Oropharynx is clear.   Eyes:      Extraocular Movements: Extraocular movements intact.      Conjunctiva/sclera: Conjunctivae normal.   Cardiovascular:      Rate and Rhythm: Normal rate and regular rhythm.   Pulmonary:      Effort: Pulmonary effort is normal.      Breath sounds: Normal breath sounds.   Abdominal:      General: Bowel sounds are normal.      Palpations: Abdomen is soft.   Musculoskeletal:         General: Normal range of motion.      Cervical back: Normal range of motion.   Skin:     General: Skin is warm.   Neurological:      General: No focal deficit present.      Mental Status: He is alert. Mental status is at baseline.   Psychiatric:         Mood and Affect: Mood normal.         Thought Content: Thought content normal.         LABS:   274}  I have reviewed old labs below:  Lab Results   Component Value Date    WBC 12.42 " 08/30/2019    HGB 12.4 (L) 08/30/2019    HCT 36.4 (L) 08/30/2019    MCV 87 08/30/2019     (L) 08/30/2019     08/30/2019    K 4.1 08/30/2019     08/30/2019    CALCIUM 9.2 08/30/2019    CO2 26 08/30/2019    GLU 94 08/30/2019    BUN 7 08/30/2019    CREATININE 0.7 08/30/2019    ANIONGAP 8 08/30/2019    ESTGFRAFRICA SEE COMMENT 08/30/2019    EGFRNONAA SEE COMMENT 08/30/2019    PROT 8.2 08/28/2019    ALBUMIN 4.8 (H) 08/28/2019    BILITOT 1.5 (H) 08/28/2019    ALKPHOS 136 08/28/2019    ALT 18 08/28/2019    AST 29 08/28/2019    CHOL 114 (L) 09/29/2023    TRIG 82 09/29/2023    HDL 44 09/29/2023    LDLCALC 53.6 (L) 09/29/2023       ASSESSMENT AND PLAN:  274}  Assessment & Plan    IMPRESSION:  Reviewed patient's current medication regimen, focusing on daily Vyvanse use for ADHD management  Considered need for annual health screening, given extended period since last labs  Evaluated patient's overall health status, noting stable mood and absence of depressive symptoms  Confirmed absence of tobacco or nicotine product use    ADHD:  - stable on current dose.  reviewed. Uds.    LABS:  - Ordered annual screening labs and urine drug screen.    FOLLOW UP:  - Scheduled follow up in 1 year for annual health screening.    MEDICATIONS/SUPPLEMENTS:  - Continued vitamins.        1. Asperger's syndrome  - CBC Auto Differential; Future  - Comprehensive Metabolic Panel; Future  - TSH; Future    2. Attention deficit hyperactivity disorder (ADHD), predominantly hyperactive type  - TSH; Future  - POCT Urine Drug Screen (With BUP)  - VYVANSE 40 mg Cap; Take 1 capsule (40 mg total) by mouth every morning.  Dispense: 30 capsule; Refill: 0  - VYVANSE 40 mg Cap; Take 1 capsule (40 mg total) by mouth every morning.  Dispense: 30 capsule; Refill: 0  - VYVANSE 40 mg Cap; Take 1 capsule (40 mg total) by mouth every morning.  Dispense: 30 capsule; Refill: 0    3. Abnormal blood chemistry  - Hemoglobin A1C; Future    4. Encounter for  lipid screening for cardiovascular disease  - Lipid Panel; Future    5. Vitamin D deficiency disease  - Vitamin D 25-Hydroxy; Future         Orders Placed This Encounter   Procedures    CBC Auto Differential    Comprehensive Metabolic Panel    Hemoglobin A1C    Lipid Panel    TSH    Vitamin D 25-Hydroxy    POCT Urine Drug Screen (With BUP)       No follow-ups on file. or sooner as needed.

## 2025-05-12 ENCOUNTER — PATIENT MESSAGE (OUTPATIENT)
Dept: FAMILY MEDICINE | Facility: CLINIC | Age: 20
End: 2025-05-12
Payer: COMMERCIAL

## 2025-05-16 ENCOUNTER — RESULTS FOLLOW-UP (OUTPATIENT)
Dept: FAMILY MEDICINE | Facility: CLINIC | Age: 20
End: 2025-05-16

## 2025-05-16 ENCOUNTER — LAB VISIT (OUTPATIENT)
Dept: LAB | Facility: HOSPITAL | Age: 20
End: 2025-05-16
Attending: STUDENT IN AN ORGANIZED HEALTH CARE EDUCATION/TRAINING PROGRAM
Payer: COMMERCIAL

## 2025-05-16 ENCOUNTER — OFFICE VISIT (OUTPATIENT)
Dept: FAMILY MEDICINE | Facility: CLINIC | Age: 20
End: 2025-05-16
Payer: COMMERCIAL

## 2025-05-16 VITALS
OXYGEN SATURATION: 100 % | BODY MASS INDEX: 22.33 KG/M2 | WEIGHT: 130.81 LBS | SYSTOLIC BLOOD PRESSURE: 134 MMHG | HEIGHT: 64 IN | HEART RATE: 94 BPM | DIASTOLIC BLOOD PRESSURE: 68 MMHG | RESPIRATION RATE: 16 BRPM

## 2025-05-16 DIAGNOSIS — Z13.6 ENCOUNTER FOR LIPID SCREENING FOR CARDIOVASCULAR DISEASE: ICD-10-CM

## 2025-05-16 DIAGNOSIS — Z13.220 ENCOUNTER FOR LIPID SCREENING FOR CARDIOVASCULAR DISEASE: ICD-10-CM

## 2025-05-16 DIAGNOSIS — F90.1 ATTENTION DEFICIT HYPERACTIVITY DISORDER (ADHD), PREDOMINANTLY HYPERACTIVE TYPE: ICD-10-CM

## 2025-05-16 DIAGNOSIS — F84.5 ASPERGER'S SYNDROME: ICD-10-CM

## 2025-05-16 DIAGNOSIS — E55.9 VITAMIN D DEFICIENCY DISEASE: ICD-10-CM

## 2025-05-16 DIAGNOSIS — R79.9 ABNORMAL BLOOD CHEMISTRY: ICD-10-CM

## 2025-05-16 LAB
25(OH)D3+25(OH)D2 SERPL-MCNC: 29 NG/ML (ref 30–96)
ABSOLUTE EOSINOPHIL (OHS): 0.1 K/UL
ABSOLUTE MONOCYTE (OHS): 0.54 K/UL (ref 0.3–1)
ABSOLUTE NEUTROPHIL COUNT (OHS): 6.88 K/UL (ref 1.8–7.7)
ALBUMIN SERPL BCP-MCNC: 4.3 G/DL (ref 3.5–5.2)
ALP SERPL-CCNC: 86 UNIT/L (ref 40–150)
ALT SERPL W/O P-5'-P-CCNC: 17 UNIT/L (ref 10–44)
AMP AMPHETAMINE 1000 NM/ML POC: ABNORMAL
ANION GAP (OHS): 8 MMOL/L (ref 8–16)
AST SERPL-CCNC: 31 UNIT/L (ref 11–45)
BAR BARBITURATES 300 NG/ML POC: NEGATIVE
BASOPHILS # BLD AUTO: 0.03 K/UL
BASOPHILS NFR BLD AUTO: 0.3 %
BILIRUB SERPL-MCNC: 1.3 MG/DL (ref 0.1–1)
BUN SERPL-MCNC: 22 MG/DL (ref 6–20)
BUP BUPRENORPHINE 10 NG/ML POC: NEGATIVE
BZO BENZODIAZEPINES 300 NG/ML POC: NEGATIVE
CALCIUM SERPL-MCNC: 9.6 MG/DL (ref 8.7–10.5)
CHLORIDE SERPL-SCNC: 107 MMOL/L (ref 95–110)
CHOLEST SERPL-MCNC: 131 MG/DL (ref 120–199)
CHOLEST/HDLC SERPL: 3.5 {RATIO} (ref 2–5)
CO2 SERPL-SCNC: 25 MMOL/L (ref 23–29)
COC COCAINE 300 NG/ML POC: NEGATIVE
CREAT SERPL-MCNC: 0.9 MG/DL (ref 0.5–1.4)
CREATININE (CR) POC: 100
CTP QC/QA: YES
EAG (OHS): 103 MG/DL (ref 68–131)
ERYTHROCYTE [DISTWIDTH] IN BLOOD BY AUTOMATED COUNT: 12.3 % (ref 11.5–14.5)
GFR SERPLBLD CREATININE-BSD FMLA CKD-EPI: >60 ML/MIN/1.73/M2
GLUCOSE SERPL-MCNC: 102 MG/DL (ref 70–110)
HBA1C MFR BLD: 5.2 % (ref 4–5.6)
HCT VFR BLD AUTO: 39.4 % (ref 40–54)
HDLC SERPL-MCNC: 37 MG/DL (ref 40–75)
HDLC SERPL: 28.2 % (ref 20–50)
HGB BLD-MCNC: 13.7 GM/DL (ref 14–18)
IMM GRANULOCYTES # BLD AUTO: 0.03 K/UL (ref 0–0.04)
IMM GRANULOCYTES NFR BLD AUTO: 0.3 % (ref 0–0.5)
LDLC SERPL CALC-MCNC: 74.8 MG/DL (ref 63–159)
LYMPHOCYTES # BLD AUTO: 2.17 K/UL (ref 1–4.8)
MCH RBC QN AUTO: 29 PG (ref 27–31)
MCHC RBC AUTO-ENTMCNC: 34.8 G/DL (ref 32–36)
MCV RBC AUTO: 84 FL (ref 82–98)
MET METHAMPHETAMINE 1000 NG/ML POC: NEGATIVE
MOP/OPI300 MORPHINE 300 NG/ML POC: NEGATIVE
MTD METHADONE 300 NG/ML POC: NEGATIVE
NONHDLC SERPL-MCNC: 94 MG/DL
NUCLEATED RBC (/100WBC) (OHS): 0 /100 WBC
OXIDANT (OX) POC: NEGATIVE
OXY OXYCODONE 100 NG/ML POC: NEGATIVE
PLATELET # BLD AUTO: 214 K/UL (ref 150–450)
PMV BLD AUTO: 10.2 FL (ref 9.2–12.9)
POTASSIUM SERPL-SCNC: 4.1 MMOL/L (ref 3.5–5.1)
PROT SERPL-MCNC: 7.4 GM/DL (ref 6–8.4)
RBC # BLD AUTO: 4.72 M/UL (ref 4.6–6.2)
RELATIVE EOSINOPHIL (OHS): 1 %
RELATIVE LYMPHOCYTE (OHS): 22.3 % (ref 18–48)
RELATIVE MONOCYTE (OHS): 5.5 % (ref 4–15)
RELATIVE NEUTROPHIL (OHS): 70.6 % (ref 38–73)
SODIUM SERPL-SCNC: 140 MMOL/L (ref 136–145)
SPECIFIC GRAVITY (SG) POC: 1
TEMPERATURE (°F) POC: 96
THC MARIJUANA 50 NG/ML POC: NEGATIVE
TRIGL SERPL-MCNC: 96 MG/DL (ref 30–150)
TSH SERPL-ACNC: 1.28 UIU/ML (ref 0.4–4)
WBC # BLD AUTO: 9.75 K/UL (ref 3.9–12.7)

## 2025-05-16 PROCEDURE — 99999 PR PBB SHADOW E&M-EST. PATIENT-LVL III: CPT | Mod: PBBFAC,,, | Performed by: STUDENT IN AN ORGANIZED HEALTH CARE EDUCATION/TRAINING PROGRAM

## 2025-05-16 PROCEDURE — 82306 VITAMIN D 25 HYDROXY: CPT

## 2025-05-16 PROCEDURE — 85025 COMPLETE CBC W/AUTO DIFF WBC: CPT

## 2025-05-16 PROCEDURE — 83036 HEMOGLOBIN GLYCOSYLATED A1C: CPT

## 2025-05-16 PROCEDURE — 36415 COLL VENOUS BLD VENIPUNCTURE: CPT

## 2025-05-16 PROCEDURE — 82465 ASSAY BLD/SERUM CHOLESTEROL: CPT

## 2025-05-16 PROCEDURE — 82435 ASSAY OF BLOOD CHLORIDE: CPT

## 2025-05-16 PROCEDURE — 84443 ASSAY THYROID STIM HORMONE: CPT

## 2025-05-16 RX ORDER — LISDEXAMFETAMINE DIMESYLATE 40 MG/1
40 CAPSULE ORAL EVERY MORNING
Qty: 30 CAPSULE | Refills: 0 | Status: SHIPPED | OUTPATIENT
Start: 2025-06-16

## 2025-05-16 RX ORDER — LISDEXAMFETAMINE DIMESYLATE 40 MG/1
40 CAPSULE ORAL EVERY MORNING
Qty: 30 CAPSULE | Refills: 0 | Status: SHIPPED | OUTPATIENT
Start: 2025-07-16

## 2025-05-16 RX ORDER — LISDEXAMFETAMINE DIMESYLATE 40 MG/1
40 CAPSULE ORAL EVERY MORNING
Qty: 30 CAPSULE | Refills: 0 | OUTPATIENT
Start: 2025-05-16

## 2025-05-16 RX ORDER — LISDEXAMFETAMINE DIMESYLATE 40 MG/1
40 CAPSULE ORAL EVERY MORNING
Qty: 30 CAPSULE | Refills: 0 | Status: SHIPPED | OUTPATIENT
Start: 2025-05-16

## 2025-05-16 NOTE — PROGRESS NOTES
SUBJECTIVE:    CHIEF COMPLAINT:   Chief Complaint   Patient presents with    Medication Refill     Vyvanse refill            274}    HISTORY OF PRESENT ILLNESS:  Matias Gill is a 19 y.o. male who presents to the clinic today for annual check up   History of Present Illness    CHIEF COMPLAINT:  Mr. Chelo gill presents today for follow-up and medication refill    ADHD:  He has a documented mental health evaluation from a psychologist confirming ADHD diagnosis. 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      ROS:  General: -fever, -chills, -fatigue, -weight gain, -weight loss  Eyes: -vision changes, -redness, -discharge  ENT: -ear pain, -nasal congestion, -sore throat  Cardiovascular: -chest pain, -palpitations, -lower extremity edema  Respiratory: -cough, -shortness of breath  Gastrointestinal: -abdominal pain, -nausea, -vomiting, -diarrhea, -constipation, -blood in stool  Genitourinary: -dysuria, -hematuria, -frequency  Musculoskeletal: -joint pain, -muscle pain  Skin: -rash, -lesion  Neurological: -headache, -dizziness, -numbness, -tingling  Psychiatric: -anxiety, -depression, -sleep difficulty          PAST MEDICAL HISTORY:     274}  Past Medical History:   Diagnosis Date    ADHD (attention deficit hyperactivity disorder)     Asperger syndrome     Tourette syndrome     mild       PAST SURGICAL HISTORY:  Past Surgical History:   Procedure Laterality Date    LAPAROSCOPIC APPENDECTOMY N/A 8/28/2019    Procedure: APPENDECTOMY, LAPAROSCOPIC;  Surgeon: Adan Barone MD;  [facility]: [facility];  Service: General;  Laterality: N/A;    SURGICAL REMOVAL OF ABSCESS  2005    Abdominal lymphnode abscess removal       SOCIAL HISTORY:  Social History[1]    FAMILY HISTORY:       No family history on file.    ALLERGIES AND MEDICATIONS: updated and reviewed.      274}  Review of patient's allergies indicates:  No Known Allergies  Medication List with  "Changes/Refills   Changed and/or Refilled Medications    Modified Medication Previous Medication    VYVANSE 40 MG CAP VYVANSE 40 mg Cap       Take 1 capsule (40 mg total) by mouth every morning.    Take 1 capsule (40 mg total) by mouth every morning.    VYVANSE 40 MG CAP VYVANSE 40 mg Cap       Take 1 capsule (40 mg total) by mouth every morning.    Take 1 capsule (40 mg total) by mouth every morning.    VYVANSE 40 MG CAP VYVANSE 40 mg Cap       Take 1 capsule (40 mg total) by mouth every morning.    Take 1 capsule (40 mg total) by mouth every morning.       SCREENING HISTORY:    274}  Health Maintenance         Date Due Completion Date    HPV Vaccines (1 - Male 3-dose series) Never done ---    COVID-19 Vaccine (1 - 2024-25 season) Never done ---    Influenza Vaccine (Season Ended) 09/01/2025 9/29/2023    TETANUS VACCINE 07/20/2027 7/20/2017    RSV Vaccine (Age 60+ and Pregnant patients) (1 - 1-dose 75+ series) 11/01/2079 ---            REVIEW OF SYSTEMS:   ROS    PHYSICAL EXAM:      274}  /68 (BP Location: Left arm, Patient Position: Sitting)   Pulse 94   Resp 16   Ht 5' 4.02" (1.626 m)   Wt 59.3 kg (130 lb 12.8 oz)   SpO2 100%   BMI 22.44 kg/m²   Wt Readings from Last 3 Encounters:   05/16/25 59.3 kg (130 lb 12.8 oz)   02/05/25 59.2 kg (130 lb 8.2 oz)   11/11/24 60.6 kg (133 lb 8 oz)     BP Readings from Last 3 Encounters:   05/16/25 134/68   02/05/25 96/70   11/11/24 98/60     Estimated body mass index is 22.44 kg/m² as calculated from the following:    Height as of this encounter: 5' 4.02" (1.626 m).    Weight as of this encounter: 59.3 kg (130 lb 12.8 oz).     Physical Exam  HENT:      Head: Normocephalic and atraumatic.      Nose: Nose normal.      Mouth/Throat:      Mouth: Mucous membranes are dry.      Pharynx: Oropharynx is clear.   Eyes:      Extraocular Movements: Extraocular movements intact.      Conjunctiva/sclera: Conjunctivae normal.   Cardiovascular:      Rate and Rhythm: Normal rate and " regular rhythm.   Pulmonary:      Effort: Pulmonary effort is normal.      Breath sounds: Normal breath sounds.   Abdominal:      General: Bowel sounds are normal.      Palpations: Abdomen is soft.   Musculoskeletal:         General: Normal range of motion.      Cervical back: Normal range of motion.   Skin:     General: Skin is warm.   Neurological:      General: No focal deficit present.      Mental Status: He is alert. Mental status is at baseline.   Psychiatric:         Mood and Affect: Mood normal.         Thought Content: Thought content normal.         LABS:   274}  I have reviewed old labs below:  Lab Results   Component Value Date    WBC 12.42 08/30/2019    HGB 12.4 (L) 08/30/2019    HCT 36.4 (L) 08/30/2019    MCV 87 08/30/2019     (L) 08/30/2019     08/30/2019    K 4.1 08/30/2019     08/30/2019    CALCIUM 9.2 08/30/2019    CO2 26 08/30/2019    GLU 94 08/30/2019    BUN 7 08/30/2019    CREATININE 0.7 08/30/2019    ANIONGAP 8 08/30/2019    ESTGFRAFRICA SEE COMMENT 08/30/2019    EGFRNONAA SEE COMMENT 08/30/2019    PROT 8.2 08/28/2019    ALBUMIN 4.8 (H) 08/28/2019    BILITOT 1.5 (H) 08/28/2019    ALKPHOS 136 08/28/2019    ALT 18 08/28/2019    AST 29 08/28/2019    CHOL 114 (L) 09/29/2023    TRIG 82 09/29/2023    HDL 44 09/29/2023    LDLCALC 53.6 (L) 09/29/2023       ASSESSMENT AND PLAN:  274}  Assessment & Plan    IMPRESSION:  Continued current ADHD medication management with 3 months of prescriptions, first to be filled at [place], [place], and subsequent 2 at [facility] in [place], [place] ([address] [place] [address]).    ATTENTION-DEFICIT HYPERACTIVITY DISORDER (ADHD):  - Discussed using mental health evaluation from psychologist for new doctor to establish ADHD diagnosis.  - Continued current ADHD medication management with 3 months of prescriptions.  - First prescription to be filled at [facility] in [place], [place], with subsequent 2 at [place], [place] ([address] [place] [address]  Avenue).  - Ordered urine drug screen as part of ongoing medication management.  - Advised patient to maintain current mental health evaluation documentation from psychologist for potential new healthcare providers.    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  - Ordered CBC, CMP, lipid panel, and liver function tests for annual labs to assess hepatic, renal function, and cholesterol levels as part of routine health monitoring.    FOLLOW-UP:  - Follow up in 3 months, subject to change based on relocation plans.        1. Attention deficit hyperactivity disorder (ADHD), predominantly hyperactive type  - VYVANSE 40 mg Cap; Take 1 capsule (40 mg total) by mouth every morning.  Dispense: 30 capsule; Refill: 0  - POCT Urine Drug Screen (With BUP)  - VYVANSE 40 mg Cap; Take 1 capsule (40 mg total) by mouth every morning.  Dispense: 30 capsule; Refill: 0  - VYVANSE 40 mg Cap; Take 1 capsule (40 mg total) by mouth every morning.  Dispense: 30 capsule; Refill: 0         Orders Placed This Encounter   Procedures    POCT Urine Drug Screen (With BUP)       Follow up in about 3 months (around 8/16/2025). or sooner as needed.                 [1]   Social History  Socioeconomic History    Marital status: Single   Tobacco Use    Smoking status: Never     Passive exposure: Yes    Smokeless tobacco: Never    Tobacco comments:     step father and step mother     Social Drivers of Health     Financial Resource Strain: Patient Declined (12/26/2023)    Overall Financial Resource Strain (CARDIA)     Difficulty of Paying Living Expenses: Patient declined   Food Insecurity: Patient Declined (12/26/2023)    Hunger Vital Sign     Worried About Running Out of Food in the Last Year: Patient declined     Ran Out of Food in the Last Year: Patient declined   Transportation Needs: No Transportation Needs (12/26/2023)    PRAPARE - Transportation     Lack of Transportation  (Medical): No     Lack of Transportation (Non-Medical): No   Physical Activity: Sufficiently Active (12/26/2023)    Exercise Vital Sign     Days of Exercise per Week: 5 days     Minutes of Exercise per Session: 120 min   Stress: Stress Concern Present (12/26/2023)    [employer] - Occupational Stress Questionnaire     Feeling of Stress : Very much   Housing Stability: Unknown (12/26/2023)    Housing Stability Vital Sign     Unable to Pay for Housing in the Last Year: No     Unstable Housing in the Last Year: Patient declined

## 2025-05-16 NOTE — TELEPHONE ENCOUNTER
Refill Request              --->Care Gap information included in message below if applicable.       Automatic Epic Generated Protocol Data:        Requested Prescriptions   Pending Prescriptions Disp Refills    VYVANSE 40 mg Cap 30 capsule 0     Sig: Take 1 capsule (40 mg total) by mouth every morning.       There is no refill protocol information for this order           Appointments  past 12m or future 3m with PCP    Date Provider   Last Visit   2/5/2025 Jose Hughes MD   Next Visit   5/16/2025 Jose Hughes MD   ED visits in past 90 days: 0        Note composed:9:40 AM 05/16/2025

## 2025-05-16 NOTE — TELEPHONE ENCOUNTER
No care due was identified.  [facility] Embedded Care Due Messages. Reference number: [id number].   5/16/2025 9:40:28 AM CDT

## 2025-06-13 DIAGNOSIS — F90.1 ATTENTION DEFICIT HYPERACTIVITY DISORDER (ADHD), PREDOMINANTLY HYPERACTIVE TYPE: ICD-10-CM

## 2025-06-13 RX ORDER — LISDEXAMFETAMINE DIMESYLATE 40 MG/1
40 CAPSULE ORAL EVERY MORNING
Qty: 30 CAPSULE | Refills: 0 | Status: CANCELLED | OUTPATIENT
Start: 2025-06-13

## 2025-06-13 NOTE — TELEPHONE ENCOUNTER
No care due was identified.  [facility] Embedded Care Due Messages. Reference number: [id number].   6/13/2025 10:49:34 AM CDT

## 2025-06-16 ENCOUNTER — PATIENT MESSAGE (OUTPATIENT)
Dept: FAMILY MEDICINE | Facility: CLINIC | Age: 20
End: 2025-06-16
Payer: COMMERCIAL

## 2025-06-16 DIAGNOSIS — F90.1 ATTENTION DEFICIT HYPERACTIVITY DISORDER (ADHD), PREDOMINANTLY HYPERACTIVE TYPE: ICD-10-CM

## 2025-06-16 RX ORDER — LISDEXAMFETAMINE DIMESYLATE 40 MG/1
40 CAPSULE ORAL EVERY MORNING
Qty: 30 CAPSULE | Refills: 0 | Status: SHIPPED | OUTPATIENT
Start: 2025-06-16

## 2025-07-16 ENCOUNTER — PATIENT MESSAGE (OUTPATIENT)
Dept: FAMILY MEDICINE | Facility: CLINIC | Age: 20
End: 2025-07-16
Payer: COMMERCIAL

## 2025-07-16 DIAGNOSIS — F90.1 ATTENTION DEFICIT HYPERACTIVITY DISORDER (ADHD), PREDOMINANTLY HYPERACTIVE TYPE: ICD-10-CM

## 2025-07-16 RX ORDER — LISDEXAMFETAMINE DIMESYLATE 40 MG/1
40 CAPSULE ORAL EVERY MORNING
Qty: 30 CAPSULE | Refills: 0 | Status: SHIPPED | OUTPATIENT
Start: 2025-07-16

## 2025-08-19 ENCOUNTER — OFFICE VISIT (OUTPATIENT)
Dept: FAMILY MEDICINE | Facility: CLINIC | Age: 20
End: 2025-08-19
Payer: COMMERCIAL

## 2025-08-19 VITALS
RESPIRATION RATE: 18 BRPM | HEART RATE: 72 BPM | WEIGHT: 132 LBS | DIASTOLIC BLOOD PRESSURE: 62 MMHG | HEIGHT: 64 IN | OXYGEN SATURATION: 99 % | BODY MASS INDEX: 22.53 KG/M2 | SYSTOLIC BLOOD PRESSURE: 118 MMHG

## 2025-08-19 DIAGNOSIS — E53.8 B12 DEFICIENCY: ICD-10-CM

## 2025-08-19 DIAGNOSIS — E61.1 IRON DEFICIENCY: ICD-10-CM

## 2025-08-19 DIAGNOSIS — E80.6 BILIRUBINEMIA: Primary | ICD-10-CM

## 2025-08-19 DIAGNOSIS — F90.1 ATTENTION DEFICIT HYPERACTIVITY DISORDER (ADHD), PREDOMINANTLY HYPERACTIVE TYPE: ICD-10-CM

## 2025-08-19 LAB
AMP AMPHETAMINE 1000 NM/ML POC: ABNORMAL
BAR BARBITURATES 300 NG/ML POC: NEGATIVE
BUP BUPRENORPHINE 10 NG/ML POC: NEGATIVE
BZO BENZODIAZEPINES 300 NG/ML POC: NEGATIVE
COC COCAINE 300 NG/ML POC: NEGATIVE
CREATININE (CR) POC: 100
CTP QC/QA: YES
MET METHAMPHETAMINE 1000 NG/ML POC: NEGATIVE
MOP/OPI300 MORPHINE 300 NG/ML POC: NEGATIVE
MTD METHADONE 300 NG/ML POC: NEGATIVE
OXIDANT (OX) POC: NEGATIVE
OXY OXYCODONE 100 NG/ML POC: NEGATIVE
SPECIFIC GRAVITY (SG) POC: 1.02
TEMPERATURE (°F) POC: 96
THC MARIJUANA 50 NG/ML POC: NEGATIVE

## 2025-08-19 PROCEDURE — 3078F DIAST BP <80 MM HG: CPT | Mod: S$GLB,,, | Performed by: STUDENT IN AN ORGANIZED HEALTH CARE EDUCATION/TRAINING PROGRAM

## 2025-08-19 PROCEDURE — 3044F HG A1C LEVEL LT 7.0%: CPT | Mod: S$GLB,,, | Performed by: STUDENT IN AN ORGANIZED HEALTH CARE EDUCATION/TRAINING PROGRAM

## 2025-08-19 PROCEDURE — 80305 DRUG TEST PRSMV DIR OPT OBS: CPT | Mod: QW,S$GLB,, | Performed by: STUDENT IN AN ORGANIZED HEALTH CARE EDUCATION/TRAINING PROGRAM

## 2025-08-19 PROCEDURE — 99214 OFFICE O/P EST MOD 30 MIN: CPT | Mod: S$GLB,,, | Performed by: STUDENT IN AN ORGANIZED HEALTH CARE EDUCATION/TRAINING PROGRAM

## 2025-08-19 PROCEDURE — 3074F SYST BP LT 130 MM HG: CPT | Mod: S$GLB,,, | Performed by: STUDENT IN AN ORGANIZED HEALTH CARE EDUCATION/TRAINING PROGRAM

## 2025-08-19 PROCEDURE — G2211 COMPLEX E/M VISIT ADD ON: HCPCS | Mod: S$GLB,,, | Performed by: STUDENT IN AN ORGANIZED HEALTH CARE EDUCATION/TRAINING PROGRAM

## 2025-08-19 PROCEDURE — 3008F BODY MASS INDEX DOCD: CPT | Mod: S$GLB,,, | Performed by: STUDENT IN AN ORGANIZED HEALTH CARE EDUCATION/TRAINING PROGRAM

## 2025-08-19 PROCEDURE — 99999 PR PBB SHADOW E&M-EST. PATIENT-LVL III: CPT | Mod: PBBFAC,,, | Performed by: STUDENT IN AN ORGANIZED HEALTH CARE EDUCATION/TRAINING PROGRAM

## 2025-08-19 PROCEDURE — 1159F MED LIST DOCD IN RCRD: CPT | Mod: S$GLB,,, | Performed by: STUDENT IN AN ORGANIZED HEALTH CARE EDUCATION/TRAINING PROGRAM

## 2025-08-19 RX ORDER — LISDEXAMFETAMINE DIMESYLATE 40 MG/1
40 CAPSULE ORAL EVERY MORNING
Qty: 30 CAPSULE | Refills: 0 | Status: SHIPPED | OUTPATIENT
Start: 2025-09-19

## 2025-08-19 RX ORDER — LISDEXAMFETAMINE DIMESYLATE 40 MG/1
40 CAPSULE ORAL EVERY MORNING
Qty: 30 CAPSULE | Refills: 0 | Status: SHIPPED | OUTPATIENT
Start: 2025-08-19

## 2025-08-19 RX ORDER — LISDEXAMFETAMINE DIMESYLATE 40 MG/1
40 CAPSULE ORAL EVERY MORNING
Qty: 30 CAPSULE | Refills: 0 | Status: SHIPPED | OUTPATIENT
Start: 2025-10-19

## (undated) DEVICE — TROCAR ENDOPATH XCEL 5X100MM

## (undated) DEVICE — CANNULA ENDOPATH XCEL 5X100MM

## (undated) DEVICE — IRRIGATOR SUCTION W/TIP

## (undated) DEVICE — SOL IRR NACL .9% 3000ML

## (undated) DEVICE — COLLECTOR SPECIMEN ANAEROBIC

## (undated) DEVICE — SHEARS HARMONIC 5CM 36CM

## (undated) DEVICE — STRAP OR TABLE 5IN X 72IN

## (undated) DEVICE — CLOSURE SKIN STERI STRIP 1/2X4

## (undated) DEVICE — NDL SAFETY 21G X 1 1/2 ECLPSE

## (undated) DEVICE — ELECTRODE REM PLYHSV RETURN 9

## (undated) DEVICE — CART STAPLE FLEX ETX 3.5MM BLU

## (undated) DEVICE — SYR 30CC LUER LOCK

## (undated) DEVICE — SLEEVE SCD EXPRESS CALF MEDIUM

## (undated) DEVICE — PACK CUSTOM ENDO CHOLO SLI

## (undated) DEVICE — SEE MEDLINE ITEM 157117

## (undated) DEVICE — TUBING PNEUMO

## (undated) DEVICE — NDL HYPODERMIC BLUNT 18G 1.5IN

## (undated) DEVICE — TROCAR KII BLLN 12MM 10CM

## (undated) DEVICE — KIT ANTIFOG

## (undated) DEVICE — BAG TISS RETRV MONARCH 10MM

## (undated) DEVICE — SUT MONOCRYL 4-0 PS-2

## (undated) DEVICE — GLOVE SURG ULTRA TOUCH 7.5

## (undated) DEVICE — SEE MEDLINE ITEM 152622

## (undated) DEVICE — SUT 0 VICRYL / UR6 (J603)

## (undated) DEVICE — APPLICATOR CHLORAPREP ORN 26ML

## (undated) DEVICE — SOL WATER STRL IRR 1000ML

## (undated) DEVICE — LINER SUCTION 3000CC

## (undated) DEVICE — ELECTRODE BLADE INSULATED 1 IN

## (undated) DEVICE — SWAB CULTURETTE SINGLE